# Patient Record
Sex: MALE | Race: WHITE | URBAN - METROPOLITAN AREA
[De-identification: names, ages, dates, MRNs, and addresses within clinical notes are randomized per-mention and may not be internally consistent; named-entity substitution may affect disease eponyms.]

---

## 2019-01-03 NOTE — PATIENT DISCUSSION
Continue: Refresh Classic (PF) (polyvinyl alcohol-povidon(pf)): dropperette: 1.4-0.6% twice a day into both eyes

## 2019-01-03 NOTE — PATIENT DISCUSSION
May try punctal plugs or Restasis if symptoms do not improve (educated patient the Restasis takes 45 days to tell if any improvement). Patient can call if she would like to try Restasis (should wait 2 months after starting Restasis to come back if she starts Restasis).

## 2019-03-05 NOTE — PATIENT DISCUSSION
Glasses Given: It was explained that when the vision no longer meets the patient's needs, and when a new prescription for glasses does not improve visual satisfaction, that the option of the yag laser is a reasonable next step. It has been determined from manifest refraction and vision testing that a new prescription for glasses may help improve the visual symptoms somewhat but it is not known if they will provide vision that is satisfactory.  A new prescription for glasses was provided to the patient and we will re-evaluate in 6 months unless visual symptoms persist.

## 2019-03-05 NOTE — PATIENT DISCUSSION
POSTERIOR CAPSULAR FIBROSIS/OPACIFICATION, OU - NOT VISUALLY SIGNIFICANT. FOLLOW AT THIS TIME. RECOMMEND PATIENT FILL GLASSES PRESCRIPTION FIRST TO SEE IF THE HAZINESS IMPROVES. IF PATIENT DOES NOT SEE IMPROVEMENT LIKELY TO PROCEEDS WITH YAG LASER OS&gt;OD.

## 2019-11-21 NOTE — PATIENT DISCUSSION
MODERATE DRY EYES: PRESCRIBED PRESERVATIVE OR PRESERVATIVE FREE ARTIFICIAL TEARS QID-6X A DAY OU, AND THE DAILY INTAKE OF OMEGA-3 DHA/EPA FATTY ACIDS TO HELP RELIEVE SYMPTOMS. ADD NIGHTLY LUBRICATING OINTMENT OR GEL. RETURN FOR FOLLOW-UP AS SCHEDULED OR SOONER IF SYMPTOMS WORSEN.

## 2020-03-10 ENCOUNTER — IMPORTED ENCOUNTER (OUTPATIENT)
Dept: URBAN - METROPOLITAN AREA CLINIC 50 | Facility: CLINIC | Age: 70
End: 2020-03-10

## 2020-05-07 NOTE — PATIENT DISCUSSION
Continue: Refresh Tears (carboxymethylcellulose sodium): drops: 0.5% 1 drop once a day into both eyes

## 2020-05-07 NOTE — PATIENT DISCUSSION
"        Praveen Gongora   2017 3:30 PM   Office Visit   MRN: [id number]    Department:  [facility]   Dept Phone:  [phone]    Description:  Male : 1941   Provider:  Hipolito Crabtree PA-C           Reason for Visit     Cerumen Impaction Plugged (B) ears      Allergies as of 2017     No Known Allergies      You were diagnosed with     Impacted cerumen of left ear   [907508]         Vital Signs     Blood Pressure Pulse Temperature Height Weight Body Mass Index    120/80 mmHg 93 36.3 °C (97.3 °F) 1.88 m (6' 2.02\") 102.059 kg (225 lb) 28.88 kg/m2    Oxygen Saturation Smoking Status                95% Former Smoker          Basic Information     Date Of Birth Sex Race Ethnicity Preferred Language    1941 Male White Non- English      Problem List              ICD-10-CM Priority Class Noted - Resolved    Essential hypertension I10   2016 - Present    Hyperlipidemia E78.5   2016 - Present    Depression F32.9   2016 - Present    H/O renal cell carcinoma Z85.528   2016 - Present    Hyperglycemia R73.9   2016 - Present      Health Maintenance        Date Due Completion Dates    IMM DTaP/Tdap/Td Vaccine (1 - Tdap) 1960 ---    COLONOSCOPY 1991 ---    IMM ZOSTER VACCINE 2001 ---    IMM PNEUMOCOCCAL 65+ (ADULT) LOW/MEDIUM RISK SERIES (1 of 2 - PCV13) 2006 ---            Current Immunizations     Influenza TIV (IM) 10/6/2016      Below and/or attached are the medications your provider expects you to take. Review all of your home medications and newly ordered medications with your provider and/or pharmacist. Follow medication instructions as directed by your provider and/or pharmacist. Please keep your medication list with you and share with your provider. Update the information when medications are discontinued, doses are changed, or new medications (including over-the-counter products) are added; and carry medication information at all times " GLAUCOMA S OU: in the event of emergency situations     Allergies:  No Known Allergies          Medications  Valid as of: February 05, 2017 -  3:50 PM    Generic Name Brand Name Tablet Size Instructions for use    Atorvastatin Calcium (Tab) LIPITOR 20 MG Take 1 Tab by mouth every day.        Multiple Vitamins-Minerals   Take 2 Tabs by mouth every day.        Omega-3 Fatty Acids (Cap) Omega-3 1000 MG Take 1 Cap by mouth every day.        Simvastatin (Tab) ZOCOR 20 MG Take 1 Tab by mouth every day. [facility]        Telmisartan (Tab) MICARDIS 40 MG Take 1 Tab by mouth every day. Fax to [facility]        Triamcinolone Acetonide (Aerosol) NASACORT 55 MCG/ACT [place] 1 Spray in nose every day.        Triamcinolone Acetonide (Cream) KENALOG 0.1 % Apply 1 Application to affected area(s) every day.        Venlafaxine HCl (Tab) EFFEXOR 75 MG Take 1 Tab by mouth every day. [facility]        .                 Medicines prescribed today were sent to:     [facility] - [address] [address]    Phone: [phone] Fax: [phone]    Open 24 Hours?: No    [facility] [address] [place], [place] - [address] AT [facility] [address]    [address] [place] [address]    Phone: [phone] Fax: [phone]    Open 24 Hours?: No      Medication refill instructions:       If your prescription bottle indicates you have medication refills left, it is not necessary to call your provider’s office. Please contact your pharmacy and they will refill your medication.    If your prescription bottle indicates you do not have any refills left, you may request refills at any time through one of the following ways: The online [employer] system (except Urgent Care), by calling your provider’s office, or by asking your pharmacy to contact your provider’s office with a refill request. Medication refills are processed only during regular business hours  and may not be available until the next business day. Your provider may request additional information or to have a follow-up visit with you prior to refilling your medication.   *Please Note: Medication refills are assigned a new Rx number when refilled electronically. Your pharmacy may indicate that no refills were authorized even though a new prescription for the same medication is available at the pharmacy. Please request the medicine by name with the pharmacy before contacting your provider for a refill.           Boston Boot Access Code: [email or web address]  Expires: 3/7/2017  3:50 PM    Boston Boot  A secure, online tool to manage your health information     [employer]’s Boston Boot® is a secure, online tool that connects you to your personalized health information from the privacy of your home -- day or night - making it very easy for you to manage your healthcare. Once the activation process is completed, you can even access your medical information using the Boston Boot lincoln, which is available for free in the Apple Lincoln store or Google Play store.     Boston Boot provides the following levels of access (as shown below):   My Chart Features   [facility] Doctor [facility]  Specialists [facility]  Urgent  Care Non-[facility]  Primary Care  Doctor   Email your healthcare team securely and privately 24/7 X X X    Manage appointments: schedule your next appointment; view details of past/upcoming appointments X      Request prescription refills. X      View recent personal medical records, including lab and immunizations X X X X   View health record, including health history, allergies, medications X X X X   Read reports about your outpatient visits, procedures, consult and ER notes X X X X   See your discharge summary, which is a recap of your hospital and/or ER visit that includes your diagnosis, lab results, and care plan. X X       How to register for Boston Boot:  1. Go to  [email or web address].  2. Click on the Sign Up Now box,  which takes you to the New Member Sign Up page. You will need to provide the following information:  a. Enter your [email or web address] Access Code exactly as it appears at the top of this page. (You will not need to use this code after you’ve completed the sign-up process. If you do not sign up before the expiration date, you must request a new code.)   b. Enter your date of birth.   c. Enter your home email address.   d. Click Submit, and follow the next screen’s instructions.  3. Create a [email or web address] ID. This will be your [email or web address] login ID and cannot be changed, so think of one that is secure and easy to remember.  4. Create a [email or web address] password. You can change your password at any time.  5. Enter your Password Reset Question and Answer. This can be used at a later time if you forget your password.   6. Enter your e-mail address. This allows you to receive e-mail notifications when new information is available in [email or web address].  7. Click Sign Up. You can now view your health information.    For assistance activating your [email or web address] account, call [phone]

## 2021-02-02 ENCOUNTER — IMPORTED ENCOUNTER (OUTPATIENT)
Dept: URBAN - METROPOLITAN AREA CLINIC 50 | Facility: CLINIC | Age: 71
End: 2021-02-02

## 2021-02-11 ENCOUNTER — IMPORTED ENCOUNTER (OUTPATIENT)
Dept: URBAN - METROPOLITAN AREA CLINIC 50 | Facility: CLINIC | Age: 71
End: 2021-02-11

## 2021-03-02 ENCOUNTER — IMPORTED ENCOUNTER (OUTPATIENT)
Dept: URBAN - METROPOLITAN AREA CLINIC 50 | Facility: CLINIC | Age: 71
End: 2021-03-02

## 2021-03-10 ENCOUNTER — IMPORTED ENCOUNTER (OUTPATIENT)
Dept: URBAN - METROPOLITAN AREA CLINIC 50 | Facility: CLINIC | Age: 71
End: 2021-03-10

## 2021-03-10 NOTE — PATIENT DISCUSSION
"""S/P IOL OS: Sensar AAB00 16 +Omidria. Continue post operative instructions and drops per schedule.  """

## 2021-03-23 ENCOUNTER — IMPORTED ENCOUNTER (OUTPATIENT)
Dept: URBAN - METROPOLITAN AREA CLINIC 50 | Facility: CLINIC | Age: 71
End: 2021-03-23

## 2021-03-23 NOTE — PATIENT DISCUSSION
Posterior Capsular Fibrosis/Opacification Counseling: I have discussed the option of glasses versus YAG laser surgery versus  following. It was explained that when vision no longer meets the patient's needs, and when a new prescription for glasses is not likely to improve the patient's visual symptoms, the option of YAG laser surgery is a reasonable next step. It was  explained that there is no guarantee that removing the PCF/PCO will improve their visual symptoms. The risks, benefits and alternatives of surgery were discussed with the patient. The uncommon risk of an increase in intraocular pressure or a retinal detachment and their associated symptoms were explained to the patient.    After this discussion, the patient desires to proceed with YAG laser to improve vision

## 2021-03-23 NOTE — PATIENT DISCUSSION
PVD OU STABLE FOLLOW. PATIENT ADVISED THAT HE MAY BE NOTICING HIS FLOATER INTERMITTENTLY. PATIENT UNDERSTANDS THAT AFTER HIS YAG HE MAY NOTICE THE FLOATER MORE.

## 2021-03-31 ENCOUNTER — IMPORTED ENCOUNTER (OUTPATIENT)
Dept: URBAN - METROPOLITAN AREA CLINIC 50 | Facility: CLINIC | Age: 71
End: 2021-03-31

## 2021-03-31 PROBLEM — Z98.890: Noted: 2021-03-31

## 2021-03-31 PROBLEM — Z98.890: Noted: 2021-03-10

## 2021-03-31 NOTE — PATIENT DISCUSSION
"""S/P IOL OD: Sensar AAB00 16 +Omidria. Continue post operative instructions and drops per schedule.  """

## 2021-04-06 ENCOUNTER — IMPORTED ENCOUNTER (OUTPATIENT)
Dept: URBAN - METROPOLITAN AREA CLINIC 50 | Facility: CLINIC | Age: 71
End: 2021-04-06

## 2021-04-17 ASSESSMENT — VISUAL ACUITY
OD_BAT: 20/40
OS_CC: 20/70
OD_CC: 20/30
OS_OTHER: 20/60. 20/200.
OD_OTHER: 20/20. 20/40.
OD_CC: J1+
OD_SC: 20/30-1
OS_PH: 20/40+
OD_CC: 20/25
OD_OTHER: 20/40. 20/70.
OS_PH: 20/50
OD_BAT: 20/40
OS_CC: 20/50
OS_PH: 20/40
OS_OTHER: 20/60. 20/200.
OD_CC: 20/30
OD_CC: 20/30
OS_BAT: 20/60
OS_SC: 20/20
OD_BAT: 20/40
OD_SC: 20/20-
OS_CC: J1+
OS_CC: 20/70
OS_BAT: 20/30
OS_BAT: 20/60
OD_OTHER: 20/40. 20/70.
OD_BAT: 20/20
OD_OTHER: 20/40. 20/70.
OS_OTHER: 20/30. 20/60.
OS_CC: 20/20
OS_CC: 20/50+

## 2021-04-17 ASSESSMENT — TONOMETRY
OD_IOP_MMHG: 16
OS_IOP_MMHG: 18
OD_IOP_MMHG: 14
OS_IOP_MMHG: 19
OD_IOP_MMHG: 20
OS_IOP_MMHG: 14
OS_IOP_MMHG: 19
OD_IOP_MMHG: 22
OS_IOP_MMHG: 20
OD_IOP_MMHG: 17
OD_IOP_MMHG: 11
OD_IOP_MMHG: 34
OS_IOP_MMHG: 16

## 2021-04-20 NOTE — PATIENT DISCUSSION
S/P YAG, OS - DOING WELL. MONITOR X 1 YEAR. CONSIDER UPDATING GLASSES PRESCRIPTION TO IMPROVE HIS VISION, NEW GLS RX GIVEN.

## 2021-05-04 ENCOUNTER — 4 WEEK POST-OP (OUTPATIENT)
Dept: URBAN - METROPOLITAN AREA CLINIC 50 | Facility: CLINIC | Age: 71
End: 2021-05-04

## 2021-05-04 DIAGNOSIS — Z98.890: ICD-10-CM

## 2021-05-04 PROCEDURE — 99024 POSTOP FOLLOW-UP VISIT: CPT

## 2021-05-04 ASSESSMENT — VISUAL ACUITY
OS_GLARE: 20/100
OD_SC: 20/20
OD_GLARE: 20/20
OD_GLARE: 20/40
OS_GLARE: 20/40
OU_SC: J10
OS_SC: 20/20
OU_SC: 20/20

## 2021-05-04 ASSESSMENT — TONOMETRY
OS_IOP_MMHG: 18
OD_IOP_MMHG: 16

## 2021-07-20 ENCOUNTER — PROBLEM (OUTPATIENT)
Dept: URBAN - METROPOLITAN AREA CLINIC 50 | Facility: CLINIC | Age: 71
End: 2021-07-20

## 2021-07-20 DIAGNOSIS — H43.812: ICD-10-CM

## 2021-07-20 PROCEDURE — 92014 COMPRE OPH EXAM EST PT 1/>: CPT

## 2021-07-20 ASSESSMENT — VISUAL ACUITY
OS_SC: 20/30
OD_SC: 20/20

## 2021-07-20 ASSESSMENT — TONOMETRY
OD_IOP_MMHG: 16
OS_IOP_MMHG: 16

## 2021-08-31 ENCOUNTER — ANNUAL COMPREHENSIVE EXAM (OUTPATIENT)
Dept: URBAN - METROPOLITAN AREA CLINIC 50 | Facility: CLINIC | Age: 71
End: 2021-08-31

## 2021-08-31 DIAGNOSIS — H26.492: ICD-10-CM

## 2021-08-31 PROCEDURE — 66821 AFTER CATARACT LASER SURGERY: CPT

## 2021-08-31 PROCEDURE — 92014 COMPRE OPH EXAM EST PT 1/>: CPT

## 2021-08-31 PROCEDURE — 92015NC REFRACTION NO CHARGE

## 2021-08-31 RX ORDER — PREDNISOLONE ACETATE 10 MG/ML
1 SUSPENSION/ DROPS OPHTHALMIC TWICE A DAY
Start: 2021-08-31

## 2021-08-31 ASSESSMENT — VISUAL ACUITY
OD_SC: 20/25
OS_GLARE: 20/70
OS_SC: 20/40-1
OD_GLARE: 20/25
OU_CC: J1+@ 16 IN
OS_GLARE: 20/40
OU_SC: 20/30
OD_GLARE: 20/30

## 2021-08-31 ASSESSMENT — TONOMETRY
OD_IOP_MMHG: 14
OS_IOP_MMHG: 14

## 2021-08-31 NOTE — PROCEDURE NOTE: CLINICAL
PROCEDURE NOTE: YAG Capsulotomy #1 OS. Diagnosis: Posterior Capsular Opacification (PCO). Prep: Mydriacil 1% and Phenylephrine 2.5%. Prior to treatment, the risks/benefits/alternatives were discussed. The patient wished to proceed with procedure. Consent was signed. Proparacaine and brominidine were placed into the operative eye after the eye was dilated. Power = 3.6mJ. Number of pulses = 49. Patient tolerated procedure well and there were no complications. Post Laser instructions given. Sebastián Zuluaga

## 2021-09-30 ENCOUNTER — POST-OP LASER (OUTPATIENT)
Dept: URBAN - METROPOLITAN AREA CLINIC 50 | Facility: CLINIC | Age: 71
End: 2021-09-30

## 2021-09-30 DIAGNOSIS — Z98.890: ICD-10-CM

## 2021-09-30 PROCEDURE — 92015NC REFRACTION NO CHARGE

## 2021-09-30 PROCEDURE — 99024 POSTOP FOLLOW-UP VISIT: CPT

## 2021-09-30 ASSESSMENT — VISUAL ACUITY
OS_SC: 20/20-2
OD_SC: 20/25

## 2021-09-30 ASSESSMENT — TONOMETRY
OS_IOP_MMHG: 15
OD_IOP_MMHG: 15

## 2022-07-26 ENCOUNTER — ESTABLISHED PATIENT (OUTPATIENT)
Dept: URBAN - METROPOLITAN AREA CLINIC 50 | Facility: CLINIC | Age: 72
End: 2022-07-26

## 2022-07-26 DIAGNOSIS — H26.491: ICD-10-CM

## 2022-07-26 PROCEDURE — 66821 AFTER CATARACT LASER SURGERY: CPT

## 2022-07-26 PROCEDURE — 92014 COMPRE OPH EXAM EST PT 1/>: CPT

## 2022-07-26 PROCEDURE — 92015 DETERMINE REFRACTIVE STATE: CPT

## 2022-07-26 RX ORDER — PREDNISOLONE ACETATE 10 MG/ML
1 SUSPENSION/ DROPS OPHTHALMIC TWICE A DAY
Start: 2022-07-26

## 2022-07-26 ASSESSMENT — KERATOMETRY
OS_AXISANGLE_DEGREES: 166
OD_K2POWER_DIOPTERS: 42.50
OD_AXISANGLE2_DEGREES: 85
OS_K2POWER_DIOPTERS: 43.00
OD_K1POWER_DIOPTERS: 42.00
OS_AXISANGLE2_DEGREES: 76
OS_K1POWER_DIOPTERS: 42.25
OD_AXISANGLE_DEGREES: 175

## 2022-07-26 ASSESSMENT — VISUAL ACUITY
OD_GLARE: 20/60
OU_CC: J1+@16"
OD_SC: 20/25
OS_SC: 20/20-1
OU_SC: 20/20
OD_GLARE: 20/40

## 2022-07-26 ASSESSMENT — TONOMETRY
OS_IOP_MMHG: 20
OD_IOP_MMHG: 18

## 2022-07-26 NOTE — PROCEDURE NOTE: CLINICAL
PROCEDURE NOTE: YAG Capsulotomy #1 OD. Diagnosis: Posterior Capsular Opacification (PCO). Prep: Mydriacil 1% and Phenylephrine 2.5%. Prior to treatment, the risks/benefits/alternatives were discussed. The patient wished to proceed with procedure. Consent was signed. Proparacaine and brominidine were placed into the operative eye after the eye was dilated. Power = 4.2mJ. Number of pulses = 50. Patient tolerated procedure well and there were no complications. Post Laser instructions given. Mini Medrano

## 2022-08-26 ENCOUNTER — POST-OP (OUTPATIENT)
Dept: URBAN - METROPOLITAN AREA CLINIC 50 | Facility: CLINIC | Age: 72
End: 2022-08-26

## 2022-08-26 DIAGNOSIS — Z98.890: ICD-10-CM

## 2022-08-26 ASSESSMENT — KERATOMETRY
OS_AXISANGLE2_DEGREES: 76
OD_K1POWER_DIOPTERS: 42.00
OS_AXISANGLE_DEGREES: 166
OS_K1POWER_DIOPTERS: 42.25
OD_AXISANGLE_DEGREES: 175
OD_K2POWER_DIOPTERS: 42.50
OS_K2POWER_DIOPTERS: 43.00
OD_AXISANGLE2_DEGREES: 85

## 2022-08-26 ASSESSMENT — VISUAL ACUITY
OU_CC: J1+
OS_SC: 20/25
OD_SC: 20/20

## 2022-08-26 ASSESSMENT — TONOMETRY
OD_IOP_MMHG: 14
OS_IOP_MMHG: 14

## 2023-09-01 ENCOUNTER — COMPREHENSIVE EXAM (OUTPATIENT)
Dept: URBAN - METROPOLITAN AREA CLINIC 24 | Facility: CLINIC | Age: 73
End: 2023-09-01

## 2023-09-01 DIAGNOSIS — H43.813: ICD-10-CM

## 2023-09-01 PROCEDURE — 92014 COMPRE OPH EXAM EST PT 1/>: CPT

## 2023-09-01 ASSESSMENT — KERATOMETRY
OS_K1POWER_DIOPTERS: 42.25
OS_AXISANGLE_DEGREES: 166
OD_AXISANGLE_DEGREES: 175
OD_AXISANGLE2_DEGREES: 85
OD_K2POWER_DIOPTERS: 42.50
OS_AXISANGLE2_DEGREES: 76
OS_K2POWER_DIOPTERS: 43.00
OD_K1POWER_DIOPTERS: 42.00

## 2023-09-01 ASSESSMENT — VISUAL ACUITY
OU_SC: 20/20
OU_CC: J1+
OS_SC: 20/25
OD_SC: 20/20

## 2023-09-01 ASSESSMENT — TONOMETRY
OS_IOP_MMHG: 14
OD_IOP_MMHG: 14

## 2024-11-12 ENCOUNTER — COMPREHENSIVE EXAM (OUTPATIENT)
Dept: URBAN - METROPOLITAN AREA CLINIC 50 | Facility: LOCATION | Age: 74
End: 2024-11-12

## 2024-11-12 DIAGNOSIS — Z98.890: ICD-10-CM

## 2024-11-12 DIAGNOSIS — H02.834: ICD-10-CM

## 2024-11-12 DIAGNOSIS — H02.831: ICD-10-CM

## 2024-11-12 DIAGNOSIS — H43.813: ICD-10-CM

## 2024-11-12 DIAGNOSIS — H04.123: ICD-10-CM

## 2024-11-12 PROCEDURE — 99214 OFFICE O/P EST MOD 30 MIN: CPT
